# Patient Record
Sex: MALE | Race: WHITE
[De-identification: names, ages, dates, MRNs, and addresses within clinical notes are randomized per-mention and may not be internally consistent; named-entity substitution may affect disease eponyms.]

---

## 2019-02-07 ENCOUNTER — HOSPITAL ENCOUNTER (INPATIENT)
Dept: HOSPITAL 54 - GPS | Age: 74
LOS: 8 days | Discharge: HOME | DRG: 885 | End: 2019-02-15
Attending: PSYCHIATRY & NEUROLOGY | Admitting: PSYCHIATRY & NEUROLOGY
Payer: MEDICARE

## 2019-02-07 VITALS — WEIGHT: 154 LBS | BODY MASS INDEX: 19.15 KG/M2 | HEIGHT: 75 IN

## 2019-02-07 DIAGNOSIS — G62.9: ICD-10-CM

## 2019-02-07 DIAGNOSIS — M19.90: ICD-10-CM

## 2019-02-07 DIAGNOSIS — E87.6: ICD-10-CM

## 2019-02-07 DIAGNOSIS — R45.851: ICD-10-CM

## 2019-02-07 DIAGNOSIS — F41.9: ICD-10-CM

## 2019-02-07 DIAGNOSIS — F17.210: ICD-10-CM

## 2019-02-07 DIAGNOSIS — G89.4: ICD-10-CM

## 2019-02-07 DIAGNOSIS — F29: ICD-10-CM

## 2019-02-07 DIAGNOSIS — F33.2: Primary | ICD-10-CM

## 2019-02-08 VITALS — DIASTOLIC BLOOD PRESSURE: 95 MMHG | SYSTOLIC BLOOD PRESSURE: 141 MMHG

## 2019-02-08 RX ADMIN — LORAZEPAM PRN MG: 0.5 TABLET ORAL at 23:51

## 2019-02-08 RX ADMIN — HYDROCODONE BITARTRATE AND ACETAMINOPHEN PRN EA: 10; 325 TABLET ORAL at 23:31

## 2019-02-08 RX ADMIN — TEMAZEPAM PRN MG: 7.5 CAPSULE ORAL at 22:06

## 2019-02-08 NOTE — NUR
GPS-RN ADMISSION NOTES:



PATIENT ADMITTED FROM [facility]. ADMITTED ON 5150 FOR DTS. CAME TO THE UNIT 
AROUND 2045 VIA GURNEY, BROUGHT IN BY 2 EMT'S. PATIENT IS  ADMITTED DUE TO SUICIDAL IDEATION 
AND A DESIRE TO DIE VIA SHOOTING HIMSELF WITH HIS GUN. PT STATED "IF I WERE TO KILL MYSELF I 
WOULD SHOOT MYSELF IT WOULD BE QUICK AND EASY". PLACED PATIENT IN BED COMFORTABLY. PATIENT 
SHOWS NO S/SX OF ANY DISTRESS, RESPIRATION EVEN, BREATHING PATTERN NON-LABORED, DENIES PAIN 
OR DISCOMFORT AT THIS TIME. UPON FACE TO FACE ASSESSMENT PATIENT IS ALERT, ORIENTED 3, 
DEPRESSED, ANXIOUS, DENIES SI/HI OR HALLUCINATIONS AT THIS TIME. AMBULATES INDEPENDENTLY. 
PT. C/O PAIN ON HIS BACK ON A SCALE OF 7/10. BELONGINGS WERE INVENTORIED AND CHECKED FOR 
CONTRABAND.  ORDERS WERE OBTAINED FROM DR. ALLEN, AND UNDER THE MEDICAL CARE OF FLYNN BRANCH, MED RECON DONE. SKIN ASSESSMENT DONE. BED LOCKED AND PLACED ON LOWEST POSITION TO 
MAINTAIN SAFETY. FALL PRECAUTIONS IMPLEMENTED.  WILL CONTINUE TO MONITOR Q 15 MINS. FOR 
SAFETY AND BEHAVIOR.

## 2019-02-09 VITALS — DIASTOLIC BLOOD PRESSURE: 89 MMHG | SYSTOLIC BLOOD PRESSURE: 133 MMHG

## 2019-02-09 VITALS — SYSTOLIC BLOOD PRESSURE: 130 MMHG | DIASTOLIC BLOOD PRESSURE: 67 MMHG

## 2019-02-09 VITALS — DIASTOLIC BLOOD PRESSURE: 88 MMHG | SYSTOLIC BLOOD PRESSURE: 127 MMHG

## 2019-02-09 LAB
ALBUMIN SERPL BCP-MCNC: 3.6 G/DL (ref 3.4–5)
ALP SERPL-CCNC: 58 U/L (ref 46–116)
ALT SERPL W P-5'-P-CCNC: 42 U/L (ref 12–78)
APPEARANCE UR: CLEAR
AST SERPL W P-5'-P-CCNC: 33 U/L (ref 15–37)
BASOPHILS # BLD AUTO: 0.1 /CMM (ref 0–0.2)
BASOPHILS NFR BLD AUTO: 0.7 % (ref 0–2)
BILIRUB SERPL-MCNC: 1.2 MG/DL (ref 0.2–1)
BILIRUB UR QL STRIP: NEGATIVE
BUN SERPL-MCNC: 10 MG/DL (ref 7–18)
CALCIUM SERPL-MCNC: 8.9 MG/DL (ref 8.5–10.1)
CHLORIDE SERPL-SCNC: 96 MMOL/L (ref 98–107)
CHOLEST SERPL-MCNC: 134 MG/DL (ref ?–200)
CO2 SERPL-SCNC: 29 MMOL/L (ref 21–32)
COLOR UR: YELLOW
CREAT SERPL-MCNC: 0.6 MG/DL (ref 0.6–1.3)
EOSINOPHIL NFR BLD AUTO: 0.5 % (ref 0–6)
GLUCOSE SERPL-MCNC: 104 MG/DL (ref 74–106)
GLUCOSE UR STRIP-MCNC: NEGATIVE MG/DL
HCT VFR BLD AUTO: 43 % (ref 39–51)
HDLC SERPL-MCNC: 56 MG/DL (ref 40–60)
HGB BLD-MCNC: 15 G/DL (ref 13.5–17.5)
HGB UR QL STRIP: NEGATIVE ERY/UL
KETONES UR STRIP-MCNC: NEGATIVE MG/DL
LDLC SERPL DIRECT ASSAY-MCNC: 78 MG/DL (ref 0–99)
LEUKOCYTE ESTERASE UR QL STRIP: NEGATIVE
LYMPHOCYTES NFR BLD AUTO: 2.1 /CMM (ref 0.8–4.8)
LYMPHOCYTES NFR BLD AUTO: 27.6 % (ref 20–44)
MAGNESIUM SERPL-MCNC: 2.3 MG/DL (ref 1.8–2.4)
MCHC RBC AUTO-ENTMCNC: 35 G/DL (ref 31–36)
MCV RBC AUTO: 104 FL (ref 80–96)
MONOCYTES NFR BLD AUTO: 1 /CMM (ref 0.1–1.3)
MONOCYTES NFR BLD AUTO: 13.7 % (ref 2–12)
NEUTROPHILS # BLD AUTO: 4.4 /CMM (ref 1.8–8.9)
NEUTROPHILS NFR BLD AUTO: 57.5 % (ref 43–81)
NITRITE UR QL STRIP: NEGATIVE
PH UR STRIP: 7 [PH] (ref 5–8)
PHOSPHATE SERPL-MCNC: 2.8 MG/DL (ref 2.5–4.9)
PLATELET # BLD AUTO: 240 /CMM (ref 150–450)
POTASSIUM SERPL-SCNC: 2.8 MMOL/L (ref 3.5–5.1)
PROT SERPL-MCNC: 7.1 G/DL (ref 6.4–8.2)
PROT UR QL STRIP: NEGATIVE MG/DL
RBC # BLD AUTO: 4.15 MIL/UL (ref 4.5–6)
SODIUM SERPL-SCNC: 133 MMOL/L (ref 136–145)
TRIGL SERPL-MCNC: 46 MG/DL (ref 30–150)
TSH SERPL DL<=0.005 MIU/L-ACNC: 5.06 UIU/ML (ref 0.36–3.74)
UROBILINOGEN UR STRIP-MCNC: 0.2 EU/DL
WBC NRBC COR # BLD AUTO: 7.6 K/UL (ref 4.3–11)

## 2019-02-09 RX ADMIN — NICOTINE SCH MG: 14 PATCH TRANSDERMAL at 16:17

## 2019-02-09 RX ADMIN — POTASSIUM CHLORIDE SCH MEQ: 750 TABLET, FILM COATED, EXTENDED RELEASE ORAL at 08:28

## 2019-02-09 RX ADMIN — POTASSIUM CHLORIDE SCH MEQ: 750 TABLET, FILM COATED, EXTENDED RELEASE ORAL at 10:30

## 2019-02-09 RX ADMIN — LORAZEPAM PRN MG: 0.5 TABLET ORAL at 22:37

## 2019-02-09 RX ADMIN — LORAZEPAM PRN MG: 0.5 TABLET ORAL at 10:27

## 2019-02-09 RX ADMIN — HYDROCODONE BITARTRATE AND ACETAMINOPHEN PRN EA: 10; 325 TABLET ORAL at 05:40

## 2019-02-09 RX ADMIN — TEMAZEPAM PRN MG: 7.5 CAPSULE ORAL at 21:16

## 2019-02-09 RX ADMIN — HYDROCODONE BITARTRATE AND ACETAMINOPHEN PRN EA: 10; 325 TABLET ORAL at 12:43

## 2019-02-09 RX ADMIN — DULOXETINE HYDROCHLORIDE SCH MG: 30 CAPSULE, DELAYED RELEASE ORAL at 21:15

## 2019-02-09 RX ADMIN — HYDROCODONE BITARTRATE AND ACETAMINOPHEN PRN EA: 10; 325 TABLET ORAL at 18:53

## 2019-02-09 NOTE — NUR
GPS/RN OPENING NOTES

RECEIVED PATIENT AWAKE, ABLE TO VERBALIZE NEEDS, AMBULATORY, PAIN MEDICATION WAS GIVEN EARLY 
BEFORE START OF SHIFT, REPORT RECEIVED FROM AM RN FOR MEGHAN.WILL MONITOR.

## 2019-02-09 NOTE — NUR
GPS/RN-NOTES

PATIENT REQUESTING FOR ATIVAN FOR  ANXIETY. ATIVAN 0.5MG P.O GIVEN AS PRN ORDER. WILL CONT. 
MONITORING FOR SAFETY AND BEHAVIOR.

## 2019-02-09 NOTE — NUR
GPS/RN-NOTES 

PATIENT REQUESTING FOR NORCO FOR 8/10 GENERALIZED BODY PAIN. NORCO 10/325MG P.O GIVEN AS PRN 
ORDER.

## 2019-02-10 VITALS — SYSTOLIC BLOOD PRESSURE: 113 MMHG | DIASTOLIC BLOOD PRESSURE: 66 MMHG

## 2019-02-10 VITALS — DIASTOLIC BLOOD PRESSURE: 80 MMHG | SYSTOLIC BLOOD PRESSURE: 134 MMHG

## 2019-02-10 VITALS — DIASTOLIC BLOOD PRESSURE: 91 MMHG | SYSTOLIC BLOOD PRESSURE: 141 MMHG

## 2019-02-10 RX ADMIN — LORAZEPAM PRN MG: 0.5 TABLET ORAL at 09:22

## 2019-02-10 RX ADMIN — HYDROCODONE BITARTRATE AND ACETAMINOPHEN PRN EA: 10; 325 TABLET ORAL at 21:08

## 2019-02-10 RX ADMIN — HYDROCODONE BITARTRATE AND ACETAMINOPHEN PRN EA: 10; 325 TABLET ORAL at 15:00

## 2019-02-10 RX ADMIN — HYDROCODONE BITARTRATE AND ACETAMINOPHEN PRN EA: 10; 325 TABLET ORAL at 09:22

## 2019-02-10 RX ADMIN — HYDROCODONE BITARTRATE AND ACETAMINOPHEN PRN EA: 10; 325 TABLET ORAL at 02:58

## 2019-02-10 RX ADMIN — TEMAZEPAM PRN MG: 7.5 CAPSULE ORAL at 22:15

## 2019-02-10 RX ADMIN — NICOTINE SCH MG: 14 PATCH TRANSDERMAL at 09:19

## 2019-02-10 RX ADMIN — LORAZEPAM PRN MG: 0.5 TABLET ORAL at 15:03

## 2019-02-10 RX ADMIN — DULOXETINE HYDROCHLORIDE SCH MG: 30 CAPSULE, DELAYED RELEASE ORAL at 21:07

## 2019-02-10 NOTE — NUR
GPS RN NOTES-- PT'S NEED MET AND ANTICIPATED. PT HAS BEEN MED COMPLIANT AND COOPERATIVE WITH 
TREATMENT PLAN. PT IS NOT IN ANY APPARENT DISTRESS.SAFETY MEASURES ARE IN PLACE. REMINDED PT 
TO USE CALL [facility] WHEN ASSISTANCE IS NEEDED.

## 2019-02-11 VITALS — SYSTOLIC BLOOD PRESSURE: 154 MMHG | DIASTOLIC BLOOD PRESSURE: 89 MMHG

## 2019-02-11 VITALS — DIASTOLIC BLOOD PRESSURE: 88 MMHG | SYSTOLIC BLOOD PRESSURE: 131 MMHG

## 2019-02-11 VITALS — SYSTOLIC BLOOD PRESSURE: 139 MMHG | DIASTOLIC BLOOD PRESSURE: 98 MMHG

## 2019-02-11 LAB
APPEARANCE UR: CLEAR
BILIRUB UR QL STRIP: NEGATIVE
COLOR UR: YELLOW
GLUCOSE UR STRIP-MCNC: NEGATIVE MG/DL
HGB UR QL STRIP: NEGATIVE ERY/UL
KETONES UR STRIP-MCNC: NEGATIVE MG/DL
LEUKOCYTE ESTERASE UR QL STRIP: NEGATIVE
NITRITE UR QL STRIP: NEGATIVE
PH UR STRIP: 7 [PH] (ref 5–8)
PROT UR QL STRIP: NEGATIVE MG/DL
UROBILINOGEN UR STRIP-MCNC: 0.2 EU/DL

## 2019-02-11 RX ADMIN — LORAZEPAM PRN MG: 0.5 TABLET ORAL at 00:10

## 2019-02-11 RX ADMIN — DULOXETINE HYDROCHLORIDE SCH MG: 30 CAPSULE, DELAYED RELEASE ORAL at 22:18

## 2019-02-11 RX ADMIN — HYDROCODONE BITARTRATE AND ACETAMINOPHEN PRN EA: 10; 325 TABLET ORAL at 03:39

## 2019-02-11 RX ADMIN — NICOTINE SCH MG: 14 PATCH TRANSDERMAL at 09:24

## 2019-02-11 RX ADMIN — TEMAZEPAM PRN MG: 7.5 CAPSULE ORAL at 23:46

## 2019-02-11 RX ADMIN — HYDROCODONE BITARTRATE AND ACETAMINOPHEN PRN EA: 10; 325 TABLET ORAL at 14:43

## 2019-02-11 RX ADMIN — ACETAMINOPHEN PRN MG: 325 TABLET ORAL at 18:38

## 2019-02-11 RX ADMIN — OXYCODONE HYDROCHLORIDE AND ACETAMINOPHEN PRN UDTAB: 5; 325 TABLET ORAL at 20:50

## 2019-02-11 NOTE — NUR
GPS RN NOTE, PATIENT HAS A COMPLAINT OF CHRONIC BILATERAL LEG PAIN AT 8 OUT 10 ON THE PAIN 
SCALE AND IS REQUESTING NORCO AT THIS TIME.  PATIENT VITAL SIGNS ARE STABLE.  GAVE NORCO 
 1 TAB PO Q6HR PRN AS ORDERED.  WILL REASSESS PAIN AND I WILL CONTINUE TO MONITOR THIS 
PATIENT.

## 2019-02-11 NOTE — NUR
A man called from [phone] and stated that this was not the correct number to reach 
Yadira who the  had left a message for.

## 2019-02-11 NOTE — NUR
AFTAB called the pt's wife, Yadira ([phone]), and left her a voicemail stating that the SW 
would like to discuss the pt's initial treatment/discharge plan.

## 2019-02-11 NOTE — NUR
AFTAB decided to try the number listed for the pt in case it was a home number ([phone]) 
and Yadira, pt's wife, answered the phone. SW received the accurate cell phone number from 
her and went on to discuss the treatment plan. AFTAB stated that the psychiatrist will 
eventually decide when to release the pt based off of his behaviors and adjustment to the 
medication. AFTAB asked about transportation back at the time of discharge and the pt's wife 
stated that she would come and pick him up. AFTAB then informed her that she would keep her 
updated as to when that will be.

## 2019-02-11 NOTE — NUR
Initial Discharge Plan: Pt currently lives in his home with his wife and his daughter 
located at [address] [place] [address]; ([phone]). Per pt, he would like to 
return home. Per pt's wife, Yadira ([phone]), she would like him to return home as 
well. SW will work with the pt, his family and the MD regarding appropriate discharge plan. 
SW will form a safe and proper discharge.

## 2019-02-12 VITALS — DIASTOLIC BLOOD PRESSURE: 72 MMHG | SYSTOLIC BLOOD PRESSURE: 154 MMHG

## 2019-02-12 VITALS — DIASTOLIC BLOOD PRESSURE: 71 MMHG | SYSTOLIC BLOOD PRESSURE: 133 MMHG

## 2019-02-12 VITALS — DIASTOLIC BLOOD PRESSURE: 86 MMHG | SYSTOLIC BLOOD PRESSURE: 150 MMHG

## 2019-02-12 LAB
BUN SERPL-MCNC: 11 MG/DL (ref 7–18)
CALCIUM SERPL-MCNC: 8.8 MG/DL (ref 8.5–10.1)
CHLORIDE SERPL-SCNC: 101 MMOL/L (ref 98–107)
CO2 SERPL-SCNC: 30 MMOL/L (ref 21–32)
CREAT SERPL-MCNC: 0.8 MG/DL (ref 0.6–1.3)
GLUCOSE SERPL-MCNC: 105 MG/DL (ref 74–106)
POTASSIUM SERPL-SCNC: 3.3 MMOL/L (ref 3.5–5.1)
SODIUM SERPL-SCNC: 136 MMOL/L (ref 136–145)

## 2019-02-12 RX ADMIN — OXYCODONE HYDROCHLORIDE AND ACETAMINOPHEN PRN UDTAB: 5; 325 TABLET ORAL at 08:03

## 2019-02-12 RX ADMIN — DULOXETINE HYDROCHLORIDE SCH MG: 30 CAPSULE, DELAYED RELEASE ORAL at 21:19

## 2019-02-12 RX ADMIN — ACETAMINOPHEN PRN MG: 325 TABLET ORAL at 20:29

## 2019-02-12 RX ADMIN — OXYCODONE HYDROCHLORIDE AND ACETAMINOPHEN PRN UDTAB: 5; 325 TABLET ORAL at 14:06

## 2019-02-12 RX ADMIN — NICOTINE SCH MG: 14 PATCH TRANSDERMAL at 08:04

## 2019-02-12 RX ADMIN — TEMAZEPAM PRN MG: 7.5 CAPSULE ORAL at 23:05

## 2019-02-12 NOTE — NUR
GPS RN NOTES:

 FLYNN HERRERA ON CALL WAS ON THE UNIT, NOTIFIED ON MY FINDING PT. POTASSIUM LEVEL 3.3 , NEW 
ORDERS RECEIVED POTASSIUM CHLORIDE 40 MEQ PO X1 , NEW ORDERS RECEIVED AND CARRIED OUT.

## 2019-02-13 VITALS — SYSTOLIC BLOOD PRESSURE: 155 MMHG | DIASTOLIC BLOOD PRESSURE: 94 MMHG

## 2019-02-13 VITALS — DIASTOLIC BLOOD PRESSURE: 102 MMHG | SYSTOLIC BLOOD PRESSURE: 140 MMHG

## 2019-02-13 VITALS — SYSTOLIC BLOOD PRESSURE: 142 MMHG | DIASTOLIC BLOOD PRESSURE: 84 MMHG

## 2019-02-13 RX ADMIN — OXYCODONE HYDROCHLORIDE AND ACETAMINOPHEN PRN UDTAB: 5; 325 TABLET ORAL at 02:31

## 2019-02-13 RX ADMIN — LORAZEPAM PRN MG: 0.5 TABLET ORAL at 05:59

## 2019-02-13 RX ADMIN — DULOXETINE HYDROCHLORIDE SCH MG: 30 CAPSULE, DELAYED RELEASE ORAL at 22:08

## 2019-02-13 RX ADMIN — OXYCODONE HYDROCHLORIDE AND ACETAMINOPHEN PRN UDTAB: 5; 325 TABLET ORAL at 09:30

## 2019-02-13 RX ADMIN — OXYCODONE HYDROCHLORIDE AND ACETAMINOPHEN PRN UDTAB: 5; 325 TABLET ORAL at 15:38

## 2019-02-13 RX ADMIN — NICOTINE SCH MG: 14 PATCH TRANSDERMAL at 08:22

## 2019-02-13 NOTE — NUR
Group note: Pt attended a group session on 2/13/19 at 11AM discussing the topic of post 
discharge goals. 



S: Pt stated, I want to be with my family. For a long time I thought that I had control and 
when I lost it, it was good because I realized what I needed to do to address my depression 
to prevent future breaks.

O: Pt was present during the group session and was cooperative. Pt appeared to be in a 
euthymic mood and presented with a calm and cooperative affect.

A: Pt understood that his goal after being discharged from the hospital requires that he 
communicate with his family members about the struggles that he faces. He also realized that 
he has to be able to focus on the positives such as family instead of the negatives such as 
his pain.

P: Pt will continue milieu treatment and medication stabilization.

## 2019-02-13 NOTE — NUR
AFTAB called the pt's wife, Yadira ([phone]), and informed her that the pt will be 
discharged on Friday and she stated that she would be the one to pick him up. She asked that 
we schedule the discharge from between 12:30-1PM due to her long commute to the hospital. AFTAB 
stated that she will accommodate her. AFTAB also informed her that the pt stated that he is 
having trouble sleeping and he is feeling pain so the psychiatrist is making a medication 
adjustment today.

## 2019-02-13 NOTE — NUR
GPS/RN-NOTES 

PATIENT REQUESTING FOR PERCOCET 5 FOR 9/10 GENERALIZED BODY PAIN. PERCOCET 5/325MG P.O GIVEN 
AS PRN ORDER.

## 2019-02-14 VITALS — DIASTOLIC BLOOD PRESSURE: 79 MMHG | SYSTOLIC BLOOD PRESSURE: 148 MMHG

## 2019-02-14 VITALS — SYSTOLIC BLOOD PRESSURE: 152 MMHG | DIASTOLIC BLOOD PRESSURE: 97 MMHG

## 2019-02-14 VITALS — SYSTOLIC BLOOD PRESSURE: 137 MMHG | DIASTOLIC BLOOD PRESSURE: 64 MMHG

## 2019-02-14 LAB
BUN SERPL-MCNC: 10 MG/DL (ref 7–18)
CALCIUM SERPL-MCNC: 8.9 MG/DL (ref 8.5–10.1)
CHLORIDE SERPL-SCNC: 102 MMOL/L (ref 98–107)
CO2 SERPL-SCNC: 27 MMOL/L (ref 21–32)
CREAT SERPL-MCNC: 0.8 MG/DL (ref 0.6–1.3)
GLUCOSE SERPL-MCNC: 96 MG/DL (ref 74–106)
POTASSIUM SERPL-SCNC: 3.7 MMOL/L (ref 3.5–5.1)
SODIUM SERPL-SCNC: 137 MMOL/L (ref 136–145)

## 2019-02-14 RX ADMIN — OXYCODONE HYDROCHLORIDE AND ACETAMINOPHEN PRN UDTAB: 5; 325 TABLET ORAL at 21:18

## 2019-02-14 RX ADMIN — OXYCODONE HYDROCHLORIDE AND ACETAMINOPHEN PRN UDTAB: 5; 325 TABLET ORAL at 16:16

## 2019-02-14 RX ADMIN — OXYCODONE HYDROCHLORIDE AND ACETAMINOPHEN PRN UDTAB: 5; 325 TABLET ORAL at 08:48

## 2019-02-14 RX ADMIN — ZOLPIDEM TARTRATE PRN MG: 5 TABLET, FILM COATED ORAL at 23:24

## 2019-02-14 RX ADMIN — NICOTINE SCH MG: 14 PATCH TRANSDERMAL at 08:47

## 2019-02-14 RX ADMIN — ZOLPIDEM TARTRATE PRN MG: 5 TABLET, FILM COATED ORAL at 22:24

## 2019-02-14 RX ADMIN — OXYCODONE HYDROCHLORIDE AND ACETAMINOPHEN PRN UDTAB: 5; 325 TABLET ORAL at 01:28

## 2019-02-14 NOTE — NUR
GPS RN NOTE, RECEIVED PATIENT AWAKE AND IN BED NO S/S OR COMPLAINTS OF PAIN AT THIS TIME.  
PATIENT IS DISPLAYING NO S/S OF APPARENT DISTRESS AT THIS TIME.  PATIENT BREATHING IS 
UNLABORED WITH EQUAL RISE AND FALL OF THE CHEST.  PATIENT IS ALERT AND ORIENTED X 3 ON ROOM 
AIR WITH A SPO2 0F 95%.  PATIENT IS MED COMPLIANT, DISORGANIZED, CALM, COOPERATIVE, AND 
NEEDS REORIENTATION.  PATIENT DENIES SUICIDE AND HOMICIDAL IDEATIONS AT THIS TIME.  PATIENT 
ASSISTED WITH TURNING AND REPOSITIONING Q2HR AND PRN FOR COMFORT AND CIRCULATION.  PATIENT 
HAS NO NEEDS AT THIS TIME.  PATIENT EDUCATED ON THE USE OF THE CALL BELL.  PATIENT BED SIDE 
RAILS ARE UP X 2 FOR SAFETY, BED IS LOCKED AND LOW.  WILL CONTINUE TO MONITOR AND MAINTAIN 
SAFETY Q15 MIN WITH THE HELP OF STAFF.

## 2019-02-14 NOTE — NUR
GPS/RN-NOTES 

PATIENT REQUESTING FOR PERCOCET 5 FOR 8/10 GENERALIZED BODY PAIN. PERCOCET 5/325MG P.O GIVEN 
AS PRN ORDER.

## 2019-02-14 NOTE — NUR
GPS RN NOTE, PATIENT HAS A COMPLAINT OF GENERALIZED PAIN AT 7 OUT 10 ON THE PAIN SCALE AND 
IS REQUESTING PERCOCET AT THIS TIME.  PATIENT VITAL SIGNS ARE STABLE.  GAVE PERCOCET 5-325 2 
UD TABS PO Q6HR PRN AS ORDERED.  WILL REASSESS PAIN AND I WILL CONTINUE TO MONITOR THIS 
PATIENT.

## 2019-02-14 NOTE — NUR
GPS RN NOTE, PATIENT HAS A COMPLAINT OF HAVING INDIGESTION AND IS REQUESTING MAALOX AT THIS 
TIME.  PATIENT VITAL SIGNS ARE STABLE.  GAVE MAALOX 30ML PO Q4HR PRN AS ORDERED.  WILL 
CONTINUE TO MONITOR THIS PATIENT.

## 2019-02-14 NOTE — NUR
GPS RN NOTE, PATIENT HAS A COMPLAINT THAT HE IS UNABLE TO SLEEP AND IS REQUESTING AMBIEN AT 
THIS TIME.  PATIENT VITAL SIGNS ARE STABLE.  GAVE AMBIEN 5 MG PO HS PRN AS ORDERED.  WILL 
REASSESS FOR INSOMNIA AND I WILL CONTINUE TO MONITOR THIS PATIENT.

## 2019-02-14 NOTE — NUR
SW asked the pt if he has any access to any firearms due to the fact that he was put on a 
hold for suicidal ideation with a plan to shoot himself. He stated that he had firearms but 
he had given them to his grandsons who do not live near him and have them locked up.

## 2019-02-14 NOTE — NUR
GPS RN NOTE, PATIENT HAS A COMPLAINT THAT HE IS UNABLE TO SLEEP AND IS REQUESTING AMBIEN AT 
THIS TIME.  PATIENT VITAL SIGNS ARE STABLE.  GAVE AMBIEN 5 MG PO HS PRN AS ORDERED AS A 
REPEATED DOSE.  WILL REASSESS FOR INSOMNIA AND I WILL CONTINUE TO MONITOR THIS PATIENT.

## 2019-02-14 NOTE — NUR
Yadira ([phone]), pt's wife, called the SW and stated that she would be able to  
the pt tomorrow but she believes that she will be a little due to the weather conditions. SW 
stated that the timing is not a problem and she will just make sure that everything will be 
set for when she arrives.

## 2019-02-14 NOTE — NUR
SW called the pt's wife, Yadira ([phone]), to verify whether or not the pt has access 
to firearms. She stated that the pt had given them to his grandsons who live in [place] 
and have them locked up with a code. AFTAB thanked for the information and confirmed that the 
pt will be discharged according to plan tomorrow.

## 2019-02-15 VITALS — SYSTOLIC BLOOD PRESSURE: 122 MMHG | DIASTOLIC BLOOD PRESSURE: 96 MMHG

## 2019-02-15 RX ADMIN — NICOTINE SCH MG: 14 PATCH TRANSDERMAL at 08:50

## 2019-02-15 RX ADMIN — OXYCODONE HYDROCHLORIDE AND ACETAMINOPHEN PRN UDTAB: 5; 325 TABLET ORAL at 03:21

## 2019-02-15 RX ADMIN — OXYCODONE HYDROCHLORIDE AND ACETAMINOPHEN PRN UDTAB: 5; 325 TABLET ORAL at 09:51

## 2019-02-15 NOTE — NUR
GPS/RN-NOTES

PATIENT DISCHARGE TO HOME TODAY. DR. GONZÁLES COVERING FOR DR. ALVAREZ AND DR. DE JESUS 
AGREES WITH ORDERS. PATIENT DID NOT VERBALIZE SI/HI,DENIES VISUAL/AUDITORY HALLUCINATIONS AT 
THE TIME OF DISCHARGE . PATIENT WAS  BY WIFE. PATIENT LEFT THE UNIT IN STABLE 
CONDITION A/O X3 AMBULATORY WITH STEADY GAIT ,NO ACUTE DISTRESS NOTED.  ALL DISCHARGE 
MEDICATIONS WAS REVIEWED WITH THE PATIENT WITH UNDERSTANDING,RX WAS GIVEN TO THE PATIENT AND 
ENDORSE TO WIFE INDRA. PATIENT LEFT WITH ALL BELONGINGS ,ASSISTED IN THE LOBBY FOR SAFETY.

## 2019-02-15 NOTE — NUR
GPS/RN-NOTES 

PATIENT REQUESTING FOR PERCOCET FOR 10/10 LOWER BACK PAIN. PERCOCET 5/325MG 2 TABS. P.O 
GIVEN AS PRN ORDER.

## 2019-02-15 NOTE — NUR
Discharge Note: Pt was discharged home located at [address] [place] [id number]; 
([phone]). Pt was picked up by his wife, Yadira ([phone]), around 12:30PM. Upon 
discharge, the pt appeared to be in a euthymic mood and presented with a calm and 
cooperative affect. Pt stated that he was happy about being able to go home to his family 
and focusing on the positive aspects of his life. Pt denied both suicidal and homicidal 
ideation as well as visual and auditory hallucinations. Pt was provided with smoking 
cessation referrals upon discharge as well. Pt was referred to [facility] located 
at [address], [place] [address]; [phone] and a referral was faxed to 
[phone]. Pt will also be following up with his internist, Dr. Fern Raines, 
located at [address], [place] [address]; [phone].

## 2019-02-15 NOTE — NUR
DR. GONZÁLES GAVE AN ORDER TO D/C HOLD AND D/C HOME AND TO FOLLOW UP WITH PSYCH AND MEDICAL 
DOCTORS.